# Patient Record
Sex: MALE | Race: WHITE
[De-identification: names, ages, dates, MRNs, and addresses within clinical notes are randomized per-mention and may not be internally consistent; named-entity substitution may affect disease eponyms.]

---

## 2020-04-19 ENCOUNTER — HOSPITAL ENCOUNTER (EMERGENCY)
Dept: HOSPITAL 53 - M ED | Age: 20
Discharge: ANOTHER HOSPITAL | End: 2020-04-19
Payer: COMMERCIAL

## 2020-04-19 VITALS — HEIGHT: 66 IN | WEIGHT: 146.39 LBS | BODY MASS INDEX: 23.53 KG/M2

## 2020-04-19 VITALS — SYSTOLIC BLOOD PRESSURE: 126 MMHG | DIASTOLIC BLOOD PRESSURE: 60 MMHG

## 2020-04-19 DIAGNOSIS — G83.4: Primary | ICD-10-CM

## 2020-04-19 DIAGNOSIS — R33.8: ICD-10-CM

## 2020-04-19 DIAGNOSIS — M51.27: ICD-10-CM

## 2020-04-19 LAB
ALBUMIN SERPL BCG-MCNC: 4.2 GM/DL (ref 3.2–5.2)
ALT SERPL W P-5'-P-CCNC: 11 U/L (ref 12–78)
BASOPHILS # BLD AUTO: 0.1 10^3/UL (ref 0–0.2)
BASOPHILS NFR BLD AUTO: 0.5 % (ref 0–1)
BILIRUB CONJ SERPL-MCNC: 0.1 MG/DL (ref 0–0.2)
BILIRUB SERPL-MCNC: 0.4 MG/DL (ref 0.2–1)
EOSINOPHIL # BLD AUTO: 0.1 10^3/UL (ref 0–0.5)
EOSINOPHIL NFR BLD AUTO: 0.5 % (ref 0–3)
HCT VFR BLD AUTO: 44.2 % (ref 42–52)
HGB BLD-MCNC: 14.7 G/DL (ref 13.5–17.5)
LIPASE SERPL-CCNC: 56 U/L (ref 73–393)
LYMPHOCYTES # BLD AUTO: 2.1 10^3/UL (ref 1.5–5)
LYMPHOCYTES NFR BLD AUTO: 17.5 % (ref 24–44)
MCH RBC QN AUTO: 29.8 PG (ref 27–33)
MCHC RBC AUTO-ENTMCNC: 33.3 G/DL (ref 32–36.5)
MCV RBC AUTO: 89.7 FL (ref 80–96)
MONOCYTES # BLD AUTO: 0.8 10^3/UL (ref 0–0.8)
MONOCYTES NFR BLD AUTO: 7 % (ref 0–5)
NEUTROPHILS # BLD AUTO: 8.8 10^3/UL (ref 1.5–8.5)
NEUTROPHILS NFR BLD AUTO: 74 % (ref 36–66)
PLATELET # BLD AUTO: 310 10^3/UL (ref 150–450)
PROT SERPL-MCNC: 8.4 GM/DL (ref 6.4–8.2)
RBC # BLD AUTO: 4.93 10^6/UL (ref 4.3–6.1)
WBC # BLD AUTO: 11.9 10^3/UL (ref 4–10)

## 2020-04-19 PROCEDURE — 99285 EMERGENCY DEPT VISIT HI MDM: CPT

## 2020-04-19 PROCEDURE — 96374 THER/PROPH/DIAG INJ IV PUSH: CPT

## 2020-04-19 PROCEDURE — 96375 TX/PRO/DX INJ NEW DRUG ADDON: CPT

## 2020-04-19 PROCEDURE — 80047 BASIC METABLC PNL IONIZED CA: CPT

## 2020-04-19 PROCEDURE — 96361 HYDRATE IV INFUSION ADD-ON: CPT

## 2020-04-19 PROCEDURE — 85025 COMPLETE CBC W/AUTO DIFF WBC: CPT

## 2020-04-19 PROCEDURE — 81001 URINALYSIS AUTO W/SCOPE: CPT

## 2020-04-19 PROCEDURE — 51702 INSERT TEMP BLADDER CATH: CPT

## 2020-04-19 PROCEDURE — 80076 HEPATIC FUNCTION PANEL: CPT

## 2020-04-19 PROCEDURE — 72148 MRI LUMBAR SPINE W/O DYE: CPT

## 2020-04-19 PROCEDURE — 83690 ASSAY OF LIPASE: CPT

## 2020-04-19 NOTE — ED PDOC
Post-Departure Follow-Up


dr méndez and js short faxed formal report of mri ls spine for fu mlg Lundborg-Gray,Maja MD          Apr 19, 2020 13:19

## 2020-04-19 NOTE — REP
MRI lumbar spine without contrast:

 

History:  Low back pain with leg numbness.  Inability to urinate.

 

Technique:  Sagittal and axial T1 and T2-weighted scans are acquired in the usual

fashion with and without fat saturation.  Sequences include spin echo, turbo

spin-echo, and STIR imaging sequences.

 

MRI findings: There is some straightening of the normal lumbar lordosis.  Lumbar

vertebral body heights are preserved.  No bony destructive lesion is seen.  The

tip of the conus medullaris is normal in position and appearance at T12.  No

extra vertebral abnormality is observed.

 

Axial and sagittal images taken at L5-S1 demonstrate a moderate sized right

central focal disc protrusion which extends 2-3 mm caudal.  This compresses the

right ventral margin of the thecal sac and contacts the exiting right S1 root.

No overall central canal stenosis or neural foraminal narrowing is seen.  There

is no evidence of spondylolysis or spondylolisthesis.

 

The posterior margins of the L4-5, L3-4, and L2-3 disc spaces are unremarkable.

 

At L1-2, there is mild degenerative disc narrowing but no focal disc protrusion.

 

Impression:

 

Right paracentral, moderate sized, focal disc protrusion at L5-S1 with thecal sac

compression and contact with the right S1 root.

 

 

Electronically Signed by

Saulo Simmons MD 04/19/2020 11:18 A